# Patient Record
Sex: FEMALE | URBAN - METROPOLITAN AREA
[De-identification: names, ages, dates, MRNs, and addresses within clinical notes are randomized per-mention and may not be internally consistent; named-entity substitution may affect disease eponyms.]

---

## 2022-04-30 ENCOUNTER — TELEPHONE ENCOUNTER (OUTPATIENT)
Dept: URBAN - METROPOLITAN AREA CLINIC 121 | Facility: CLINIC | Age: 77
End: 2022-04-30

## 2022-05-01 ENCOUNTER — TELEPHONE ENCOUNTER (OUTPATIENT)
Dept: URBAN - METROPOLITAN AREA CLINIC 121 | Facility: CLINIC | Age: 77
End: 2022-05-01

## 2022-05-01 RX ORDER — ROSUVASTATIN CALCIUM 5 MG
DOSAGE UNKNOWN TABLET ORAL
Status: ACTIVE | COMMUNITY
Start: 2007-11-27

## 2022-05-01 RX ORDER — ASPIRIN 81 MG/1
TABLET ORAL
Status: ACTIVE | COMMUNITY
Start: 2007-11-27

## 2024-10-21 ENCOUNTER — DASHBOARD ENCOUNTERS (OUTPATIENT)
Age: 79
End: 2024-10-21

## 2024-10-21 ENCOUNTER — OFFICE VISIT (OUTPATIENT)
Dept: URBAN - METROPOLITAN AREA CLINIC 27 | Facility: CLINIC | Age: 79
End: 2024-10-21
Payer: MEDICARE

## 2024-10-21 VITALS
DIASTOLIC BLOOD PRESSURE: 90 MMHG | WEIGHT: 211 LBS | BODY MASS INDEX: 37.39 KG/M2 | HEIGHT: 63 IN | SYSTOLIC BLOOD PRESSURE: 128 MMHG | HEART RATE: 86 BPM

## 2024-10-21 DIAGNOSIS — Z12.11 COLON CANCER SCREENING: ICD-10-CM

## 2024-10-21 DIAGNOSIS — K21.9 GASTRO-ESOPHAGEAL REFLUX DISEASE WITHOUT ESOPHAGITIS: ICD-10-CM

## 2024-10-21 DIAGNOSIS — R13.19 ESOPHAGEAL DYSPHAGIA: ICD-10-CM

## 2024-10-21 DIAGNOSIS — R07.2 SUBSTERNAL CHEST PAIN: ICD-10-CM

## 2024-10-21 PROBLEM — 305058001: Status: ACTIVE | Noted: 2024-10-21

## 2024-10-21 PROBLEM — 4568003: Status: ACTIVE | Noted: 2024-10-21

## 2024-10-21 PROBLEM — 40890009: Status: ACTIVE | Noted: 2024-10-21

## 2024-10-21 PROCEDURE — 99204 OFFICE O/P NEW MOD 45 MIN: CPT | Performed by: PHYSICIAN ASSISTANT

## 2024-10-21 RX ORDER — ROSUVASTATIN CALCIUM 5 MG
DOSAGE UNKNOWN TABLET ORAL
Status: ACTIVE | COMMUNITY
Start: 2007-11-27

## 2024-10-21 RX ORDER — FAMOTIDINE 20 MG/1
TAKE 1 TABLET TABLET, FILM COATED ORAL ONCE A DAY
Qty: 30 | Refills: 3 | OUTPATIENT
Start: 2024-10-21

## 2024-10-21 RX ORDER — ASPIRIN 81 MG/1
TABLET ORAL
Status: ACTIVE | COMMUNITY
Start: 2007-11-27

## 2024-10-21 NOTE — HPI-ZZZTODAY'S VISIT
Ms. Garay is a 79-year-old female former patient of Dr. Corado referred by Dr. Guillen for EGD and colonoscopy consultation with Dr. Tineo. Her last colonoscopy was in 2007. She is mildly constipated at baseline, though recently has developed intermittent loose stools. This started  with diarrhea while she was on 3 different abx for a dental infection and is overall improving. She is having formed stools as well. She has occasional trace red blood on the TP. She has had intermittent substernal CP since her 20s which she thinks is reflux related. Recently the pain has been more frequent and pronounced. She takes omeprazole 20mg QD. She has occasional dysphagia. No cardiac history. She is on medication for HTN. . Colonoscopy 2007: medium internal hemorrhoids; recall 10 years EGD 2008:Esophageal erosions suggestive of GERD